# Patient Record
Sex: MALE | Race: WHITE | NOT HISPANIC OR LATINO
[De-identification: names, ages, dates, MRNs, and addresses within clinical notes are randomized per-mention and may not be internally consistent; named-entity substitution may affect disease eponyms.]

---

## 2020-04-27 ENCOUNTER — RECORDS - HEALTHEAST (OUTPATIENT)
Dept: ADMINISTRATIVE | Facility: OTHER | Age: 60
End: 2020-04-27

## 2020-04-28 ENCOUNTER — HOSPITAL ENCOUNTER (OUTPATIENT)
Dept: MRI IMAGING | Facility: HOSPITAL | Age: 60
Discharge: HOME | End: 2020-04-28
Attending: FAMILY MEDICINE

## 2020-04-28 DIAGNOSIS — G89.29 CHRONIC LOWER BACK PAIN: ICD-10-CM

## 2020-04-28 DIAGNOSIS — M54.50 CHRONIC LOWER BACK PAIN: ICD-10-CM

## 2020-12-23 ENCOUNTER — RECORDS - HEALTHEAST (OUTPATIENT)
Dept: LAB | Facility: HOSPITAL | Age: 60
End: 2020-12-23

## 2020-12-23 LAB
ERYTHROCYTE [DISTWIDTH] IN BLOOD BY AUTOMATED COUNT: 11.7 % (ref 11–14.5)
HCT VFR BLD AUTO: 42.3 % (ref 40–54)
HGB BLD-MCNC: 14.4 G/DL (ref 14–18)
MCH RBC QN AUTO: 30.2 PG (ref 27–34)
MCHC RBC AUTO-ENTMCNC: 34 G/DL (ref 32–36)
MCV RBC AUTO: 89 FL (ref 80–100)
PLATELET # BLD AUTO: 225 THOU/UL (ref 140–440)
PMV BLD AUTO: 10.5 FL (ref 8.5–12.5)
RBC # BLD AUTO: 4.77 MILL/UL (ref 4.4–6.2)
WBC: 7.3 THOU/UL (ref 4–11)

## 2021-05-28 ENCOUNTER — RECORDS - HEALTHEAST (OUTPATIENT)
Dept: ADMINISTRATIVE | Facility: CLINIC | Age: 61
End: 2021-05-28

## 2021-05-29 ENCOUNTER — RECORDS - HEALTHEAST (OUTPATIENT)
Dept: ADMINISTRATIVE | Facility: CLINIC | Age: 61
End: 2021-05-29

## 2024-07-01 ENCOUNTER — TRANSFERRED RECORDS (OUTPATIENT)
Dept: HEALTH INFORMATION MANAGEMENT | Facility: CLINIC | Age: 64
End: 2024-07-01

## 2024-07-16 ENCOUNTER — MEDICAL CORRESPONDENCE (OUTPATIENT)
Dept: HEALTH INFORMATION MANAGEMENT | Facility: CLINIC | Age: 64
End: 2024-07-16
Payer: COMMERCIAL

## 2024-07-24 ENCOUNTER — HOSPITAL ENCOUNTER (OUTPATIENT)
Dept: CARDIOLOGY | Facility: HOSPITAL | Age: 64
Discharge: HOME | End: 2024-07-24
Attending: FAMILY MEDICINE | Admitting: FAMILY MEDICINE
Payer: COMMERCIAL

## 2024-07-24 DIAGNOSIS — R51.9 HEAD PAIN: ICD-10-CM

## 2024-07-24 DIAGNOSIS — R42 DIZZINESS: ICD-10-CM

## 2024-07-24 LAB
CV STRESS CURRENT BP HE: NORMAL
CV STRESS CURRENT HR HE: 107
CV STRESS CURRENT HR HE: 107
CV STRESS CURRENT HR HE: 111
CV STRESS CURRENT HR HE: 113
CV STRESS CURRENT HR HE: 119
CV STRESS CURRENT HR HE: 122
CV STRESS CURRENT HR HE: 124
CV STRESS CURRENT HR HE: 75
CV STRESS CURRENT HR HE: 77
CV STRESS CURRENT HR HE: 78
CV STRESS CURRENT HR HE: 79
CV STRESS CURRENT HR HE: 80
CV STRESS CURRENT HR HE: 81
CV STRESS CURRENT HR HE: 81
CV STRESS CURRENT HR HE: 82
CV STRESS CURRENT HR HE: 96
CV STRESS CURRENT HR HE: 96
CV STRESS CURRENT HR HE: 97
CV STRESS CURRENT HR HE: 99
CV STRESS DEVIATION TIME HE: NORMAL
CV STRESS ECHO PERCENT HR HE: NORMAL
CV STRESS EXERCISE STAGE HE: NORMAL
CV STRESS EXERCISE STAGE REACHED HE: NORMAL
CV STRESS FINAL RESTING BP HE: NORMAL
CV STRESS FINAL RESTING HR HE: 80
CV STRESS MAX HR HE: 124
CV STRESS MAX TREADMILL GRADE HE: 12
CV STRESS MAX TREADMILL SPEED HE: 2.5
CV STRESS PEAK DIA BP HE: NORMAL
CV STRESS PEAK SYS BP HE: NORMAL
CV STRESS PHASE HE: NORMAL
CV STRESS PROTOCOL HE: NORMAL
CV STRESS REASON STOPPED HE: NORMAL
CV STRESS RESTING PT POSITION HE: NORMAL
CV STRESS RESTING PT POSITION HE: NORMAL
CV STRESS ST DEVIATION AMOUNT HE: NORMAL
CV STRESS ST DEVIATION ELEVATION HE: NORMAL
CV STRESS ST EVELATION AMOUNT HE: NORMAL
CV STRESS SYMPTOMS HE: NORMAL
CV STRESS TEST TYPE HE: NORMAL
CV STRESS TOTAL STAGE TIME MIN 1 HE: NORMAL
STRESS ECHO BASELINE DIASTOLIC HE: 96
STRESS ECHO BASELINE HR: 76
STRESS ECHO BASELINE SYSTOLIC BP: 153
STRESS ECHO LAST STRESS DIASTOLIC BP: 92
STRESS ECHO LAST STRESS HR: 124
STRESS ECHO LAST STRESS SYSTOLIC BP: 168
STRESS ECHO POST ESTIMATED WORKLOAD: 7.1
STRESS ECHO POST EXERCISE DUR MIN: 5
STRESS ECHO POST EXERCISE DUR SEC: 15
STRESS ECHO TARGET HR: 133

## 2024-07-24 PROCEDURE — 93325 DOPPLER ECHO COLOR FLOW MAPG: CPT | Mod: TC

## 2024-07-24 PROCEDURE — 93018 CV STRESS TEST I&R ONLY: CPT | Performed by: GENERAL ACUTE CARE HOSPITAL

## 2024-07-24 PROCEDURE — 93350 STRESS TTE ONLY: CPT | Mod: 26 | Performed by: GENERAL ACUTE CARE HOSPITAL

## 2024-07-24 PROCEDURE — 93325 DOPPLER ECHO COLOR FLOW MAPG: CPT | Mod: 26 | Performed by: GENERAL ACUTE CARE HOSPITAL

## 2024-07-24 PROCEDURE — 93016 CV STRESS TEST SUPVJ ONLY: CPT | Performed by: INTERNAL MEDICINE

## 2024-07-24 PROCEDURE — 93321 DOPPLER ECHO F-UP/LMTD STD: CPT | Mod: 26 | Performed by: GENERAL ACUTE CARE HOSPITAL

## 2024-08-06 ENCOUNTER — TRANSCRIBE ORDERS (OUTPATIENT)
Dept: OTHER | Age: 64
End: 2024-08-06

## 2024-08-06 DIAGNOSIS — R51.9 HEAD PAIN: ICD-10-CM

## 2024-08-06 DIAGNOSIS — R51.9 HEAD PAIN: Primary | ICD-10-CM

## 2024-08-06 DIAGNOSIS — R42 DIZZINESS: Primary | ICD-10-CM

## 2024-09-04 ENCOUNTER — TRANSCRIBE ORDERS (OUTPATIENT)
Dept: OTHER | Age: 64
End: 2024-09-04

## 2024-09-04 DIAGNOSIS — R42 DIZZINESS: ICD-10-CM

## 2024-09-04 DIAGNOSIS — R55 PRE-SYNCOPE: Primary | ICD-10-CM

## 2024-09-04 NOTE — PROGRESS NOTES
HEART CARE OUT-PATIENT CONSULTATON NOTE      [facility]  [phone]      Assessment/Recommendations   Assessment: 64 year old male with dizziness    Plan:  Dizziness, chest pain, dyspnea, fatigue   Unclear etiology, though based on BP suspect due to orthostasis.  With the exertional nature do worry about ischemia though dizziness would be very atypical presentation.  Also consider arrhythmia vs noncardiac       -CTA       -14 day Zio      -Follow-up with PCP, consider neurology evaluation     Elevated BP  High at stress test, not on meds, very low today.  Concern for autonomic dysfunction, wife states BP always low       -If cardiac testing negative consider referral to neurology     Enlarged aorta   Reviewed diagnosis, natural history, surveillance       -Annual echocardiogram due 8/2025      -No lifting more than he can easily without straining      -Check abdominal US to ensue no AAA          History of Present Illness/Subjective    Indication for Consult:  I was asked to see Jimbo Sigala by Adam Almonte  for dizziness      HPI: Jimbo Sigala is a 64 year old male with a history of RA, no known CAD who presents for evaluation of dizziness.  He had stress echocardiogram that was negative at submax workload, normal rest images other than aorta 42mm.  He was advised to see cardiology.    He reports dizziness like a profound weakness, no vertigo, blurred vision, weak when in [place] left arm weak, no strength when exerting himself.  Also when walking up and down steps or mowing still happening.  Once in a while gas pain in chest, not exertional.  Also feels very fatigued, tired, tales nap in afternoon.  Seems like significant change in his health and now preventing him from doing things. No palpitations.       I reviewed notes from PCP prior to this visit.         Physical Examination  Past Cardiac History   Vitals: BP 92/52 (BP Location: Right arm, Patient Position: Sitting,  "Cuff Size: Adult Large)   Pulse 91   Resp 18   Ht 1.778 m (5' 10\")   Wt 90.7 kg (200 lb)   SpO2 95%   BMI 28.70 kg/m    BMI= Body mass index is 28.7 kg/m .  Wt Readings from Last 3 Encounters:   09/06/24 90.7 kg (200 lb)       General Appearance:   no distress, normal body habitus   ENT/Mouth: membranes moist, no oral lesions or bleeding gums.      EYES:  no scleral icterus, normal conjunctivae   Neck: no carotid bruits or thyromegaly   Chest/Lungs:   lungs are clear to auscultation, no rales or wheezing,  sternal scar, equal chest wall expansion    Cardiovascular:   Regular. Normal first and second heart sounds with no murmurs, rubs, or gallops; the carotid, radial and posterior tibial pulses are intact, Jugular venous pressure normal, No edema bilaterally    Abdomen:  no organomegaly, masses, bruits, or tenderness; bowel sounds are present   Extremities: no cyanosis or clubbing   Skin: no xanthelasma, warm.    Neurologic: normal  bilateral, no tremors           Aorta 42mm     Most Recent Echocardiogram: 7/24/2024  1. Left ventricular chamber size, wall thickness, and systolic function are  normal. The visually estimated left ventricular ejection fraction is 55-60%.  2. Right ventricular chamber size and systolic function are normal.  3. No hemodynamically significant valvular abnormalities.  4. Non-aneurysmal enlargement of the aortic root measurinig 4.2 cm.       Most Recent Stress Test: 7/24/2024  1. Non-diagnostic exercise stress echocardiogram due to the inability to  achieve the target heart rate. No evidence of stress-induced ischemia at the  achieved workload. Visually estimated post exercise left ventricular ejection  fraction increases to greater than 70%.  2. No electrocardiographic evidence of stress-induced ischemia at the achieved  workload.  3. Functional capacity is mildly impaired. The patient exercised for 5:15  minutes on the Jamil protocol, achieving 7.1 METs and 79% the " "age-predicted  maximum heart rate. The patient experienced dizziness during exercise but did  not report chest pain.    Most Recent Angiogram: None       Medical History  Family History Social History   RA  Dad had MI age 40 and at 75, atrial fibrillation, CHF     Social History     Socioeconomic History    Marital status:      Spouse name: Not on file    Number of children: Not on file    Years of education: Not on file    Highest education level: Not on file   Occupational History    Not on file   Tobacco Use    Smoking status: Never     Passive exposure: Past    Smokeless tobacco: Never   Substance and Sexual Activity    Alcohol use: Not on file    Drug use: Never    Sexual activity: Not on file   Other Topics Concern    Not on file   Social History Narrative    Not on file     Social Determinants of Health     Financial Resource Strain: Not on file   Food Insecurity: Not on file   Transportation Needs: Not on file   Physical Activity: Not on file   Stress: Not on file   Social Connections: Not on file   Interpersonal Safety: Not on file   Housing Stability: Not on file           Medications  Allergies   Current Outpatient Medications   Medication Sig Dispense Refill    ALPRAZolam (XANAX) 0.25 MG tablet Take 0.25 mg by mouth as needed (A HALF HOUR BEFORE FLYING).      diazepam (VALIUM) 5 MG tablet Take 5 mg by mouth as needed (30 MINUTES PRIOR TO MRI. MAY REPEAT IF NEEDED).      Multiple Vitamin (ONE-A-DAY ESSENTIAL) TABS Take 1 tablet by mouth daily.      rosuvastatin (CRESTOR) 5 MG tablet Take 1 tablet by mouth daily.       No Known Allergies       Lab Results    Chemistry/lipid CBC Cardiac Enzymes/BNP/TSH/INR   No results for input(s): \"CHOL\", \"HDL\", \"LDL\", \"TRIG\", \"CHOLHDLRATIO\" in the last 76449 hours.  No results for input(s): \"LDL\" in the last 07005 hours.  No results for input(s): \"NA\", \"POTASSIUM\", \"CHLORIDE\", \"CO2\", \"GLC\", \"BUN\", \"CR\", \"GFRESTIMATED\", \"FRANCA\" in the last 03465 hours.    Invalid " "input(s): \"GRFESTBLACK\"  No results for input(s): \"CR\" in the last 33088 hours.  No results for input(s): \"A1C\" in the last 65630 hours.       Recent Labs   Lab Test 12/23/20  1232   WBC 7.3   HGB 14.4   HCT 42.3   MCV 89        Recent Labs   Lab Test 12/23/20  1232   HGB 14.4    No results for input(s): \"TROPONINI\" in the last 44105 hours.  No results for input(s): \"BNP\", \"NTBNPI\", \"NTBNP\" in the last 56393 hours.  No results for input(s): \"TSH\" in the last 95878 hours.  No results for input(s): \"INR\" in the last 02663 hours.     Ashleigh Rothman MD  Noninvasive Cardiologist   [facility]                                    "

## 2024-09-06 ENCOUNTER — OFFICE VISIT (OUTPATIENT)
Dept: CARDIOLOGY | Facility: CLINIC | Age: 64
End: 2024-09-06
Payer: COMMERCIAL

## 2024-09-06 VITALS
DIASTOLIC BLOOD PRESSURE: 52 MMHG | BODY MASS INDEX: 28.63 KG/M2 | WEIGHT: 200 LBS | SYSTOLIC BLOOD PRESSURE: 92 MMHG | RESPIRATION RATE: 18 BRPM | OXYGEN SATURATION: 95 % | HEIGHT: 70 IN | HEART RATE: 91 BPM

## 2024-09-06 DIAGNOSIS — R06.09 DOE (DYSPNEA ON EXERTION): ICD-10-CM

## 2024-09-06 DIAGNOSIS — I71.21 ANEURYSM OF ASCENDING AORTA WITHOUT RUPTURE (H): ICD-10-CM

## 2024-09-06 DIAGNOSIS — R42 DIZZINESS: ICD-10-CM

## 2024-09-06 DIAGNOSIS — R51.9 NONINTRACTABLE HEADACHE, UNSPECIFIED CHRONICITY PATTERN, UNSPECIFIED HEADACHE TYPE: ICD-10-CM

## 2024-09-06 DIAGNOSIS — R94.39 ABNORMAL STRESS TEST: Primary | ICD-10-CM

## 2024-09-06 PROCEDURE — 99204 OFFICE O/P NEW MOD 45 MIN: CPT | Performed by: INTERNAL MEDICINE

## 2024-09-06 RX ORDER — ROSUVASTATIN CALCIUM 5 MG/1
1 TABLET, COATED ORAL DAILY
COMMUNITY
Start: 2024-07-17

## 2024-09-06 RX ORDER — DIAZEPAM 5 MG
5 TABLET ORAL PRN
COMMUNITY
Start: 2024-06-21

## 2024-09-06 RX ORDER — ALPRAZOLAM 0.25 MG
0.25 TABLET ORAL PRN
COMMUNITY
Start: 2024-01-19

## 2024-09-06 RX ORDER — MULTIVITAMIN
1 TABLET ORAL DAILY
COMMUNITY

## 2024-09-06 NOTE — LETTER
9/6/2024    Adam Ty MD  [facility] [address]  [place] [address]    RE: Jimbo Sigala       Dear Colleague,     [facility] the [facility].    HEART CARE OUT-PATIENT CONSULTATON NOTE      [facility]  [phone]      Assessment/Recommendations   Assessment: 64 year old male with dizziness    Plan:  Dizziness, chest pain, dyspnea, fatigue   Unclear etiology, though based on BP suspect due to orthostasis.  With the exertional nature do worry about ischemia though dizziness would be very atypical presentation.  Also consider arrhythmia vs noncardiac       -CTA       -14 day Zio      -Follow-up with PCP, consider neurology evaluation     Elevated BP  High at stress test, not on meds, very low today.  Concern for autonomic dysfunction, wife states BP always low       -If cardiac testing negative consider referral to neurology     Enlarged aorta   Reviewed diagnosis, natural history, surveillance       -Annual echocardiogram due 8/2025      -No lifting more than he can easily without straining      -Check abdominal US to ensue no AAA          History of Present Illness/Subjective    Indication for Consult:  I was asked to see Jimbo Sigala by Adam Almonte  for dizziness      HPI: Jimbo Sigala is a 64 year old male with a history of RA, no known CAD who presents for evaluation of dizziness.  He had stress echocardiogram that was negative at submax workload, normal rest images other than aorta 42mm.  He was advised to see cardiology.    He reports dizziness like a profound weakness, no vertigo, blurred vision, weak when in [place] left arm weak, no strength when exerting himself.  Also when walking up and down steps or mowing still happening.  Once in a while gas pain in chest, not exertional.  Also feels very fatigued, tired, tales nap in afternoon.  Seems like significant change in his  "health and now preventing him from doing things. No palpitations.       I reviewed notes from PCP prior to this visit.         Physical Examination  Past Cardiac History   Vitals: BP 92/52 (BP Location: Right arm, Patient Position: Sitting, Cuff Size: Adult Large)   Pulse 91   Resp 18   Ht 1.778 m (5' 10\")   Wt 90.7 kg (200 lb)   SpO2 95%   BMI 28.70 kg/m    BMI= Body mass index is 28.7 kg/m .  Wt Readings from Last 3 Encounters:   09/06/24 90.7 kg (200 lb)       General Appearance:   no distress, normal body habitus   ENT/Mouth: membranes moist, no oral lesions or bleeding gums.      EYES:  no scleral icterus, normal conjunctivae   Neck: no carotid bruits or thyromegaly   Chest/Lungs:   lungs are clear to auscultation, no rales or wheezing,  sternal scar, equal chest wall expansion    Cardiovascular:   Regular. Normal first and second heart sounds with no murmurs, rubs, or gallops; the carotid, radial and posterior tibial pulses are intact, Jugular venous pressure normal, No edema bilaterally    Abdomen:  no organomegaly, masses, bruits, or tenderness; bowel sounds are present   Extremities: no cyanosis or clubbing   Skin: no xanthelasma, warm.    Neurologic: normal  bilateral, no tremors           Aorta 42mm     Most Recent Echocardiogram: 7/24/2024  1. Left ventricular chamber size, wall thickness, and systolic function are  normal. The visually estimated left ventricular ejection fraction is 55-60%.  2. Right ventricular chamber size and systolic function are normal.  3. No hemodynamically significant valvular abnormalities.  4. Non-aneurysmal enlargement of the aortic root measurinig 4.2 cm.       Most Recent Stress Test: 7/24/2024  1. Non-diagnostic exercise stress echocardiogram due to the inability to  achieve the target heart rate. No evidence of stress-induced ischemia at the  achieved workload. Visually estimated post exercise left ventricular ejection  fraction increases to greater than " "70%.  2. No electrocardiographic evidence of stress-induced ischemia at the achieved  workload.  3. Functional capacity is mildly impaired. The patient exercised for 5:15  minutes on the Jamil protocol, achieving 7.1 METs and 79% the age-predicted  maximum heart rate. The patient experienced dizziness during exercise but did  not report chest pain.    Most Recent Angiogram: None       Medical History  Family History Social History   RA  Dad had MI age 40 and at 75, atrial fibrillation, CHF     Social History     Socioeconomic History     Marital status:      Spouse name: Not on file     Number of children: Not on file     Years of education: Not on file     Highest education level: Not on file   Occupational History     Not on file   Tobacco Use     Smoking status: Never     Passive exposure: Past     Smokeless tobacco: Never   Substance and Sexual Activity     Alcohol use: Not on file     Drug use: Never     Sexual activity: Not on file   Other Topics Concern     Not on file   Social History Narrative     Not on file     Social Determinants of Health     Financial Resource Strain: Not on file   Food Insecurity: Not on file   Transportation Needs: Not on file   Physical Activity: Not on file   Stress: Not on file   Social Connections: Not on file   Interpersonal Safety: Not on file   Housing Stability: Not on file           Medications  Allergies   Current Outpatient Medications   Medication Sig Dispense Refill     ALPRAZolam (XANAX) 0.25 MG tablet Take 0.25 mg by mouth as needed (A HALF HOUR BEFORE FLYING).       diazepam (VALIUM) 5 MG tablet Take 5 mg by mouth as needed (30 MINUTES PRIOR TO MRI. MAY REPEAT IF NEEDED).       Multiple Vitamin (ONE-A-DAY ESSENTIAL) TABS Take 1 tablet by mouth daily.       rosuvastatin (CRESTOR) 5 MG tablet Take 1 tablet by mouth daily.       No Known Allergies       Lab Results    Chemistry/lipid CBC Cardiac Enzymes/BNP/TSH/INR   No results for input(s): \"CHOL\", \"HDL\", \"LDL\", " "\"TRIG\", \"CHOLHDLRATIO\" in the last 33626 hours.  No results for input(s): \"LDL\" in the last 36993 hours.  No results for input(s): \"NA\", \"POTASSIUM\", \"CHLORIDE\", \"CO2\", \"GLC\", \"BUN\", \"CR\", \"GFRESTIMATED\", \"FRANCA\" in the last 58672 hours.    Invalid input(s): \"GRFESTBLACK\"  No results for input(s): \"CR\" in the last 86262 hours.  No results for input(s): \"A1C\" in the last 81984 hours.       Recent Labs   Lab Test 12/23/20  1232   WBC 7.3   HGB 14.4   HCT 42.3   MCV 89        Recent Labs   Lab Test 12/23/20  1232   HGB 14.4    No results for input(s): \"TROPONINI\" in the last 18557 hours.  No results for input(s): \"BNP\", \"NTBNPI\", \"NTBNP\" in the last 91780 hours.  No results for input(s): \"TSH\" in the last 14959 hours.  No results for input(s): \"INR\" in the last 62856 hours.     Ashleigh Rothman MD  Noninvasive Cardiologist   [facility]                                        Thank you for allowing me to participate in the care of your patient.      Sincerely,     Ashleigh Rothman MD     [facility]  cc:   Adam Ty MD  [facility]  [address] [place] [id number]      "

## 2024-09-06 NOTE — PATIENT INSTRUCTIONS
Your symptoms would be quite unusual for a heart issue.  However, since the stress test was not diagnostic I do think a CT scan of your heart to make sure there are no blocked vessels is reasonable.  Additionally, having you wear a heart monitor to make sure there is no arrhythmia causing your symptoms.  If the heart tests look OK then would talk to your primary doctor and consider seeing a neurologist.    Your echocardiogram did show the aorta is a bit enlarged at 42mm.  Normal is 39mm or less.  The aorta size may be normal for you or may the start of an aneurysm.  Will recheck an echocardiogram in 1 year and if no change can move to every other year monitoring.  Will also schedule an ultrasound of the aorta in the abdomen to make sure no enlargement there.

## 2024-09-13 ENCOUNTER — ORDERS ONLY (AUTO-RELEASED) (OUTPATIENT)
Dept: CARDIOLOGY | Facility: CLINIC | Age: 64
End: 2024-09-13
Payer: COMMERCIAL

## 2024-09-13 DIAGNOSIS — R42 DIZZINESS: ICD-10-CM

## 2024-09-13 DIAGNOSIS — R06.09 DOE (DYSPNEA ON EXERTION): ICD-10-CM

## 2024-09-16 ENCOUNTER — TELEPHONE (OUTPATIENT)
Dept: CARDIOLOGY | Facility: CLINIC | Age: 64
End: 2024-09-16
Payer: COMMERCIAL

## 2024-09-16 NOTE — TELEPHONE ENCOUNTER
Left detailed msg for patient's wife Noemy that PA team will address insurance request for PA and notify Dr. Rothman of any issues related to need for additional documentation or denial of services.  mg

## 2024-09-16 NOTE — TELEPHONE ENCOUNTER
[facility] Call Center    Phone Message    May a detailed message be left on voicemail: yes     Reason for Call: Other: patients spouse is calling as insurance needs a PA sent to them for patients upcoming tests, please advise, thank you.     Action Taken: Other: cardiology     Travel Screening: Not Applicable     Date of Service:

## 2024-09-16 NOTE — TELEPHONE ENCOUNTER
[facility] Call Center    Phone Message    May a detailed message be left on voicemail: yes     Reason for Call: Other: Pt wife would like a call back to discuss needing code for procedures for insurance     Action Taken: Other: Cardio    Travel Screening: Not Applicable     Date of Service:

## 2024-09-16 NOTE — TELEPHONE ENCOUNTER
"Return call to patient's wife - provided her with \"cost of care estimates\" and customer service #'s to call to obtain CPT codes for testing recommended by Dr. Rothman - understanding verbalized.  mg  "

## 2024-09-19 ENCOUNTER — TRANSFERRED RECORDS (OUTPATIENT)
Dept: HEALTH INFORMATION MANAGEMENT | Facility: CLINIC | Age: 64
End: 2024-09-19

## 2024-09-23 RX ORDER — NITROGLYCERIN 0.4 MG/1
0.4 TABLET SUBLINGUAL
Status: DISCONTINUED | OUTPATIENT
Start: 2024-09-23 | End: 2024-10-17 | Stop reason: HOSPADM

## 2024-09-23 RX ORDER — DILTIAZEM HYDROCHLORIDE 30 MG/1
120 TABLET, FILM COATED ORAL
Status: DISCONTINUED | OUTPATIENT
Start: 2024-10-16 | End: 2024-10-17 | Stop reason: HOSPADM

## 2024-09-23 RX ORDER — METOPROLOL TARTRATE 25 MG/1
25-100 TABLET, FILM COATED ORAL
Status: DISCONTINUED | OUTPATIENT
Start: 2024-10-16 | End: 2024-10-17 | Stop reason: HOSPADM

## 2024-09-23 RX ORDER — DILTIAZEM HYDROCHLORIDE 5 MG/ML
10-15 INJECTION INTRAVENOUS
Status: DISCONTINUED | OUTPATIENT
Start: 2024-09-23 | End: 2024-10-17 | Stop reason: HOSPADM

## 2024-09-23 RX ORDER — METOPROLOL TARTRATE 1 MG/ML
5-20 INJECTION, SOLUTION INTRAVENOUS
Status: DISCONTINUED | OUTPATIENT
Start: 2024-09-23 | End: 2024-10-17 | Stop reason: HOSPADM

## 2024-09-28 ENCOUNTER — HEALTH MAINTENANCE LETTER (OUTPATIENT)
Age: 64
End: 2024-09-28

## 2024-10-16 ENCOUNTER — HOSPITAL ENCOUNTER (OUTPATIENT)
Dept: ULTRASOUND IMAGING | Facility: CLINIC | Age: 64
Discharge: HOME | End: 2024-10-16
Attending: INTERNAL MEDICINE
Payer: COMMERCIAL

## 2024-10-16 ENCOUNTER — HOSPITAL ENCOUNTER (OUTPATIENT)
Dept: CT IMAGING | Facility: CLINIC | Age: 64
Discharge: HOME | End: 2024-10-16
Attending: INTERNAL MEDICINE
Payer: COMMERCIAL

## 2024-10-16 VITALS — DIASTOLIC BLOOD PRESSURE: 83 MMHG | SYSTOLIC BLOOD PRESSURE: 142 MMHG

## 2024-10-16 DIAGNOSIS — I71.21 ANEURYSM OF ASCENDING AORTA WITHOUT RUPTURE (H): ICD-10-CM

## 2024-10-16 DIAGNOSIS — R94.39 ABNORMAL STRESS TEST: ICD-10-CM

## 2024-10-16 DIAGNOSIS — R06.09 DOE (DYSPNEA ON EXERTION): ICD-10-CM

## 2024-10-16 LAB
BSA FOR ECHO PROCEDURE: 2.09 M2
CCTA ASCENDING AORTA: 3.5
CCTA SINUS: 4.4
CV CALCIUM SCORE AGATSTON LM: 53
CV CALCIUM SCORING AGATSON LAD: 127
CV CALCIUM SCORING AGATSTON CX: 5
CV CALCIUM SCORING AGATSTON RCA: 0
CV CALCIUM SCORING AGATSTON TOTAL: 185

## 2024-10-16 PROCEDURE — 250N000013 HC RX MED GY IP 250 OP 250 PS 637: Performed by: INTERNAL MEDICINE

## 2024-10-16 PROCEDURE — 75574 CT ANGIO HRT W/3D IMAGE: CPT | Mod: 26 | Performed by: GENERAL ACUTE CARE HOSPITAL

## 2024-10-16 PROCEDURE — 76706 US ABDL AORTA SCREEN AAA: CPT

## 2024-10-16 PROCEDURE — 75574 CT ANGIO HRT W/3D IMAGE: CPT

## 2024-10-16 RX ORDER — IOPAMIDOL 755 MG/ML
100 INJECTION, SOLUTION INTRAVASCULAR ONCE
Status: DISCONTINUED | OUTPATIENT
Start: 2024-10-16 | End: 2024-10-17 | Stop reason: HOSPADM

## 2024-10-16 RX ADMIN — NITROGLYCERIN 0.4 MG: 0.4 TABLET SUBLINGUAL at 06:55

## 2024-10-18 ENCOUNTER — LAB (OUTPATIENT)
Dept: LAB | Facility: CLINIC | Age: 64
End: 2024-10-18
Payer: COMMERCIAL

## 2024-10-18 DIAGNOSIS — H50.22 HYPERTROPIA OF LEFT EYE: Primary | ICD-10-CM

## 2024-10-18 LAB
CRP SERPL-MCNC: <3 MG/L
ERYTHROCYTE [SEDIMENTATION RATE] IN BLOOD BY WESTERGREN METHOD: 7 MM/HR (ref 0–20)
PLATELET # BLD AUTO: 196 10E3/UL (ref 150–450)

## 2024-10-18 PROCEDURE — 85049 AUTOMATED PLATELET COUNT: CPT

## 2024-10-18 PROCEDURE — 36415 COLL VENOUS BLD VENIPUNCTURE: CPT

## 2024-10-18 PROCEDURE — 85652 RBC SED RATE AUTOMATED: CPT

## 2024-10-18 PROCEDURE — 86140 C-REACTIVE PROTEIN: CPT

## 2024-10-21 DIAGNOSIS — I71.21 ANEURYSM OF ASCENDING AORTA WITHOUT RUPTURE (H): Primary | ICD-10-CM

## 2024-10-21 DIAGNOSIS — R06.09 DOE (DYSPNEA ON EXERTION): ICD-10-CM

## 2024-10-21 DIAGNOSIS — R94.39 ABNORMAL STRESS TEST: ICD-10-CM

## 2024-10-24 DIAGNOSIS — I71.21 ANEURYSM OF ASCENDING AORTA WITHOUT RUPTURE (H): Primary | ICD-10-CM

## 2024-10-31 DIAGNOSIS — I25.10 CAD (CORONARY ARTERY DISEASE): Primary | ICD-10-CM

## 2024-11-19 ENCOUNTER — LAB (OUTPATIENT)
Dept: LAB | Facility: HOSPITAL | Age: 64
End: 2024-11-19
Payer: COMMERCIAL

## 2024-11-19 DIAGNOSIS — I25.10 CAD (CORONARY ARTERY DISEASE): ICD-10-CM

## 2024-11-19 LAB
CHOLEST SERPL-MCNC: 195 MG/DL
FASTING STATUS PATIENT QL REPORTED: YES
HDLC SERPL-MCNC: 55 MG/DL
LDLC SERPL CALC-MCNC: 123 MG/DL
NONHDLC SERPL-MCNC: 140 MG/DL
TRIGL SERPL-MCNC: 83 MG/DL

## 2024-11-19 PROCEDURE — 36415 COLL VENOUS BLD VENIPUNCTURE: CPT

## 2024-11-19 PROCEDURE — 80061 LIPID PANEL: CPT

## 2024-12-02 ENCOUNTER — OFFICE VISIT (OUTPATIENT)
Dept: NEUROLOGY | Facility: CLINIC | Age: 64
End: 2024-12-02
Attending: FAMILY MEDICINE
Payer: COMMERCIAL

## 2024-12-02 VITALS
HEIGHT: 70 IN | HEART RATE: 84 BPM | BODY MASS INDEX: 28.63 KG/M2 | SYSTOLIC BLOOD PRESSURE: 118 MMHG | WEIGHT: 200 LBS | DIASTOLIC BLOOD PRESSURE: 79 MMHG

## 2024-12-02 DIAGNOSIS — R29.898 ARM WEAKNESS: ICD-10-CM

## 2024-12-02 DIAGNOSIS — R55 PRE-SYNCOPE: ICD-10-CM

## 2024-12-02 DIAGNOSIS — R42 DIZZINESS: ICD-10-CM

## 2024-12-02 DIAGNOSIS — G43.809 OTHER MIGRAINE WITHOUT STATUS MIGRAINOSUS, NOT INTRACTABLE: ICD-10-CM

## 2024-12-02 DIAGNOSIS — M48.02 NEUROFORAMINAL STENOSIS OF CERVICAL SPINE: Primary | ICD-10-CM

## 2024-12-02 PROCEDURE — G2211 COMPLEX E/M VISIT ADD ON: HCPCS | Performed by: PSYCHIATRY & NEUROLOGY

## 2024-12-02 PROCEDURE — 99205 OFFICE O/P NEW HI 60 MIN: CPT | Performed by: PSYCHIATRY & NEUROLOGY

## 2024-12-02 RX ORDER — NORTRIPTYLINE HYDROCHLORIDE 10 MG/1
10 CAPSULE ORAL AT BEDTIME
COMMUNITY
Start: 2024-11-05 | End: 2024-12-02

## 2024-12-02 RX ORDER — GABAPENTIN 100 MG/1
200 CAPSULE ORAL 2 TIMES DAILY
COMMUNITY
Start: 2024-10-30

## 2024-12-02 RX ORDER — NORTRIPTYLINE HYDROCHLORIDE 10 MG/1
CAPSULE ORAL
Qty: 180 CAPSULE | Refills: 1 | Status: SHIPPED | OUTPATIENT
Start: 2024-12-02

## 2024-12-02 NOTE — LETTER
12/2/2024      Jimbo Sigala  [address]   [place] [address]      Dear Colleague,    Thank you for referring your patient, Jimbo Sigala, to the [facility] NEUROLOG[facility] [place]. Please see a copy of my visit note below.    NEUROLOGY OUTPATIENT CONSULT NOTE   Dec 2, 2024     CHIEF COMPLAINT/REASON FOR VISIT/REASON FOR CONSULT  Patient presents with:  Consult: Weakness in left arm, shooting pain up into his neck, occasional dizziness with exertion, pre-syncope, headaches.    REASON FOR CONSULTATION-dizziness/left arm weakness/neck pain/headaches    REFERRAL SOURCE  Dr. Adam Ty  CC Dr. Adam Ty    HISTORY OF PRESENT ILLNESS  Jimbo Sigala is a 64 year old male seen today for evaluation of multiple issues.  Symptoms have been coming on over the last 3 to 4 years.  He did have COVID in 2020 at the beginning of the pandemic and feels that he continues to have ongoing symptoms from the COVID    1.  He reports ongoing neck pain with some shooting pain radiating down his arm but more pronounced from the forearm all the way down.  Does complain of numbness on both sides of the hand.  There is some weakness going on in the arm as well.  Has been dropping objects with his hand.  Has not done physical therapy.  Did have an MRI C-spine which did show multiple level neuroforaminal stenosis though epidural injections have not been done.    2.  He further complains of headaches that started at the base of the neck and then radiate all the way to the left temple.  These are more dull in nature.  Previously they were more migrainous in nature.  No major photophobia and photophobia.  No visual auras.  Has been put on gabapentin and nortriptyline which have been helping.  Nortriptyline is helping more than the Gabapentin.    3.  He further reports that with activity he will get more of a lightheadedness.  He has been working with cardiology and they have not really found a cause they  "had difficulty completing the stress test.  Neurological testing has been recommended by the cardiologist.  There is no vertiginous sensation.    Previous history is reviewed and this is unchanged.    PAST MEDICAL/SURGICAL HISTORY  No past medical history on file.  There is no problem list on file for this patient.  Significant of high cholesterol, migraines, tremors, arthritis    FAMILY HISTORY  No family history on file.  Negative for neurological problems other than the memory problems    SOCIAL HISTORY  Social History     Tobacco Use     Smoking status: Never     Passive exposure: Past     Smokeless tobacco: Never   Substance Use Topics     Drug use: Never       SYSTEMS REVIEW  Twelve-system ROS was done and other than the HPI this was negative/positive for neck pain, back pain, arm and leg pain, joint pain, numbness/tingling, weakness, dizziness, ringing in the ears, vision symptoms, sleeping during the day, anxiety.    MEDICATIONS  Current Outpatient Medications   Medication Sig Dispense Refill     ALPRAZolam (XANAX) 0.25 MG tablet Take 0.25 mg by mouth as needed (A HALF HOUR BEFORE FLYING).       diazepam (VALIUM) 5 MG tablet Take 5 mg by mouth as needed (30 MINUTES PRIOR TO MRI. MAY REPEAT IF NEEDED).       gabapentin (NEURONTIN) 100 MG capsule Take 200 mg by mouth 2 times daily.       Multiple Vitamin (ONE-A-DAY ESSENTIAL) TABS Take 1 tablet by mouth daily.       nortriptyline (PAMELOR) 10 MG capsule Take 2 cap/night and then increase by 1 cap/week to a max of 3 caps/night as needed and tolerated. 180 capsule 1     rosuvastatin (CRESTOR) 10 MG tablet Take 1 tablet (10 mg) by mouth daily. 90 tablet 1     No current facility-administered medications for this visit.        PHYSICAL EXAMINATION  VITALS: /79 (BP Location: Right arm, Patient Position: Sitting)   Pulse 84   Ht 1.778 m (5' 10\")   Wt 90.7 kg (200 lb)   BMI 28.70 kg/m    GENERAL: Healthy appearing, alert, no acute distress, normal " habitus.  CARDIOVASCULAR: Extremities warm and well perfused. Pulses present.   NEUROLOGICAL:  Patient is awake and oriented to self, place and time.  Attention span is normal.  Memory is grossly intact.  Language is fluent and follows commands appropriately.  Appropriate fund of knowledge. Cranial nerves 2-12 are intact. There is no pronator drift.  Motor exam shows 5/5 strength in all extremities except left shoulder abduction and elbow flexion is 4/5.  Thumb opposition is slightly weak.  Tone is symmetric bilaterally in upper and lower extremities.  Reflexes are symmetric and 2+ in upper extremities and lower extremities. Sensory exam is grossly intact to light touch, pin prick and vibration except decreased pinprick in the left hand.  Finger to nose and heel to shin is without dysmetria.  Romberg is negative.  Gait is normal and the patient is able to do tandem walk and walk on toes and heels.      DIAGNOSTICS  MRI IAC  1.  Normal enhanced MR I of the brain and internal auditory canals  2.  Mucosal thickening in the right maxillary sinus    MRI C-spine  1.  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  High-grade right foraminal stenosis at C4-C5 C5-C6 C6-C7 and C7-T1  3.  High-grade left foraminal stenosis at C6-C7 and C7-T1.  Moderate left form of stenosis at C3-C4 C4-C5 and C5-C6  4.  Prominent Modic type II changes at C5-C6    CARDIOLOGY      ECHO  Stress findings:  1. Non-diagnostic exercise stress echocardiogram due to the inability to  achieve the target heart rate. No evidence of stress-induced ischemia at the  achieved workload. Visually estimated post exercise left ventricular ejection  fraction increases to greater than 70%.  2. No electrocardiographic evidence of stress-induced ischemia at the achieved  workload.  3. Functional capacity is mildly impaired. The patient exercised for 5:15  minutes on the Jamil protocol, achieving 7.1 METs and 79% the age-predicted  maximum heart rate.  The patient experienced dizziness during exercise but did  not report chest pain.     Rest findings:  1. Left ventricular chamber size, wall thickness, and systolic function are  normal. The visually estimated left ventricular ejection fraction is 55-60%.  2. Right ventricular chamber size and systolic function are normal.  3. No hemodynamically significant valvular abnormalities.  4. Non-aneurysmal enlargement of the aortic root measurinig 4.2 cm.    OUTSIDE RECORDS  Outside referral notes and chart notes were reviewed and pertinent information has been summarized (in addition to the HPI):-        IMPRESSION/REPORT/PLAN  Neuroforaminal stenosis of cervical spine rule out cervical radiculopathy  Other migraine without status migrainosus, not intractable  Exertional dizziness/lightheadedness  Left arm weakness/numbness    This is a 64 year old male with multiple symptoms  1.  The left arm weakness/along with shooting pain down the arm is suggestive of cervical radiculopathy.  MRI C-spine does show multilevel left-sided neuroforaminal stenosis.  Will check a EMG to confirm diagnosis and to rule out entrapment neuropathy.  Will set him up with physical therapy in the meantime to see if it helps.    2.  This is headaches radiating from the neck to his left temple are suggestive of migrainous headaches.  Could be related to cervical degeneration.  Will see if physical therapy helps.  Nortriptyline has made a difference and will increase the dose further to see if it provides more benefit.  Continue using over-the-counter medications if needed for abortive therapy.    3.  His exertional dizziness/lightheadedness is more suggestive of cardiac problems.  Cardiac testing so far has been noncontributory.  He has been following up with cardiology.  Will check a CT angiogram head and neck to rule out any vertebrobasilar insufficiency which could be causing the lightheadedness.    I can see him back in 3 to 4 months after  testing.  He does travel in the winter and can do physical therapy during vacation.    -     EMG; Future  -     Physical Therapy  Referral; Future  -     CTA Head Neck w Contrast; Future  -     nortriptyline (PAMELOR) 10 MG capsule; Take 2 cap/night and then increase by 1 cap/week to a max of 3 caps/night as needed and tolerated.    Return in about 4 months (around 4/2/2025) for In-Clinic Visit (must), After testing.    Over 60 minutes were spent coordinating the care for the patient on the day of the encounter.  This includes previsit, during visit and post visit activities as documented above.  Counseling patient.  Multiple problems reviewed/addressed.  Reviewing outside records.  Testing ordered.  Prescription management.  (Activities include but not inclusive of reviewing chart, reviewing outside records, reviewing labs and imaging study results as well as the images, patient visit time including getting history and exam,  use if applicable, review of test results with the patient and coming up with a plan in a shared model, counseling patient and family, education and answering patient questions, EMR , EMR diagnosis entry and problem list management, medication reconciliation and prescription management if applicable, paperwork if applicable, printing documents and documentation of the visit activities.)          Steve Funk MD  Neurologist  [facility] Neurology [facility]  Tel:- [phone]    This note was dictated using voice recognition software.  Any grammatical or context distortions are unintentional and inherent to the software.      Again, thank you for allowing me to participate in the care of your patient.        Sincerely,        Steve Funk MD

## 2024-12-02 NOTE — NURSING NOTE
Chief Complaint   Patient presents with    Consult     Weakness in left arm, shooting pain up into his neck, occasional dizziness with exertion, pre-syncope, headaches.     Blanquita Zuñiga MA

## 2024-12-02 NOTE — PROGRESS NOTES
NEUROLOGY OUTPATIENT CONSULT NOTE   Dec 2, 2024     CHIEF COMPLAINT/REASON FOR VISIT/REASON FOR CONSULT  Patient presents with:  Consult: Weakness in left arm, shooting pain up into his neck, occasional dizziness with exertion, pre-syncope, headaches.    REASON FOR CONSULTATION-dizziness/left arm weakness/neck pain/headaches    REFERRAL SOURCE  Dr. Adam Ty   Dr. Adam Ty    HISTORY OF PRESENT ILLNESS  Jimbo Sigala is a 64 year old male seen today for evaluation of multiple issues.  Symptoms have been coming on over the last 3 to 4 years.  He did have COVID in 2020 at the beginning of the pandemic and feels that he continues to have ongoing symptoms from the COVID    1.  He reports ongoing neck pain with some shooting pain radiating down his arm but more pronounced from the forearm all the way down.  Does complain of numbness on both sides of the hand.  There is some weakness going on in the arm as well.  Has been dropping objects with his hand.  Has not done physical therapy.  Did have an MRI C-spine which did show multiple level neuroforaminal stenosis though epidural injections have not been done.    2.  He further complains of headaches that started at the base of the neck and then radiate all the way to the left temple.  These are more dull in nature.  Previously they were more migrainous in nature.  No major photophobia and photophobia.  No visual auras.  Has been put on gabapentin and nortriptyline which have been helping.  Nortriptyline is helping more than the Gabapentin.    3.  He further reports that with activity he will get more of a lightheadedness.  He has been working with cardiology and they have not really found a cause they had difficulty completing the stress test.  Neurological testing has been recommended by the cardiologist.  There is no vertiginous sensation.    Previous history is reviewed and this is unchanged.    PAST MEDICAL/SURGICAL HISTORY  No past medical history on  "file.  There is no problem list on file for this patient.  Significant of high cholesterol, migraines, tremors, arthritis    FAMILY HISTORY  No family history on file.  Negative for neurological problems other than the memory problems    SOCIAL HISTORY  Social History     Tobacco Use    Smoking status: Never     Passive exposure: Past    Smokeless tobacco: Never   Substance Use Topics    Drug use: Never       SYSTEMS REVIEW  Twelve-system ROS was done and other than the HPI this was negative/positive for neck pain, back pain, arm and leg pain, joint pain, numbness/tingling, weakness, dizziness, ringing in the ears, vision symptoms, sleeping during the day, anxiety.    MEDICATIONS  Current Outpatient Medications   Medication Sig Dispense Refill    ALPRAZolam (XANAX) 0.25 MG tablet Take 0.25 mg by mouth as needed (A HALF HOUR BEFORE FLYING).      diazepam (VALIUM) 5 MG tablet Take 5 mg by mouth as needed (30 MINUTES PRIOR TO MRI. MAY REPEAT IF NEEDED).      gabapentin (NEURONTIN) 100 MG capsule Take 200 mg by mouth 2 times daily.      Multiple Vitamin (ONE-A-DAY ESSENTIAL) TABS Take 1 tablet by mouth daily.      nortriptyline (PAMELOR) 10 MG capsule Take 2 cap/night and then increase by 1 cap/week to a max of 3 caps/night as needed and tolerated. 180 capsule 1    rosuvastatin (CRESTOR) 10 MG tablet Take 1 tablet (10 mg) by mouth daily. 90 tablet 1     No current facility-administered medications for this visit.        PHYSICAL EXAMINATION  VITALS: /79 (BP Location: Right arm, Patient Position: Sitting)   Pulse 84   Ht 1.778 m (5' 10\")   Wt 90.7 kg (200 lb)   BMI 28.70 kg/m    GENERAL: Healthy appearing, alert, no acute distress, normal habitus.  CARDIOVASCULAR: Extremities warm and well perfused. Pulses present.   NEUROLOGICAL:  Patient is awake and oriented to self, place and time.  Attention span is normal.  Memory is grossly intact.  Language is fluent and follows commands appropriately.  Appropriate fund " of knowledge. Cranial nerves 2-12 are intact. There is no pronator drift.  Motor exam shows 5/5 strength in all extremities except left shoulder abduction and elbow flexion is 4/5.  Thumb opposition is slightly weak.  Tone is symmetric bilaterally in upper and lower extremities.  Reflexes are symmetric and 2+ in upper extremities and lower extremities. Sensory exam is grossly intact to light touch, pin prick and vibration except decreased pinprick in the left hand.  Finger to nose and heel to shin is without dysmetria.  Romberg is negative.  Gait is normal and the patient is able to do tandem walk and walk on toes and heels.      DIAGNOSTICS  MRI IAC  1.  Normal enhanced MR I of the brain and internal auditory canals  2.  Mucosal thickening in the right maxillary sinus    MRI C-spine  1.  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  High-grade right foraminal stenosis at C4-C5 C5-C6 C6-C7 and C7-T1  3.  High-grade left foraminal stenosis at C6-C7 and C7-T1.  Moderate left form of stenosis at C3-C4 C4-C5 and C5-C6  4.  Prominent Modic type II changes at C5-C6    CARDIOLOGY      ECHO  Stress findings:  1. Non-diagnostic exercise stress echocardiogram due to the inability to  achieve the target heart rate. No evidence of stress-induced ischemia at the  achieved workload. Visually estimated post exercise left ventricular ejection  fraction increases to greater than 70%.  2. No electrocardiographic evidence of stress-induced ischemia at the achieved  workload.  3. Functional capacity is mildly impaired. The patient exercised for 5:15  minutes on the Jamil protocol, achieving 7.1 METs and 79% the age-predicted  maximum heart rate. The patient experienced dizziness during exercise but did  not report chest pain.     Rest findings:  1. Left ventricular chamber size, wall thickness, and systolic function are  normal. The visually estimated left ventricular ejection fraction is 55-60%.  2. Right ventricular  chamber size and systolic function are normal.  3. No hemodynamically significant valvular abnormalities.  4. Non-aneurysmal enlargement of the aortic root measurinig 4.2 cm.    OUTSIDE RECORDS  Outside referral notes and chart notes were reviewed and pertinent information has been summarized (in addition to the HPI):-        IMPRESSION/REPORT/PLAN  Neuroforaminal stenosis of cervical spine rule out cervical radiculopathy  Other migraine without status migrainosus, not intractable  Exertional dizziness/lightheadedness  Left arm weakness/numbness    This is a 64 year old male with multiple symptoms  1.  The left arm weakness/along with shooting pain down the arm is suggestive of cervical radiculopathy.  MRI C-spine does show multilevel left-sided neuroforaminal stenosis.  Will check a EMG to confirm diagnosis and to rule out entrapment neuropathy.  Will set him up with physical therapy in the meantime to see if it helps.    2.  This is headaches radiating from the neck to his left temple are suggestive of migrainous headaches.  Could be related to cervical degeneration.  Will see if physical therapy helps.  Nortriptyline has made a difference and will increase the dose further to see if it provides more benefit.  Continue using over-the-counter medications if needed for abortive therapy.    3.  His exertional dizziness/lightheadedness is more suggestive of cardiac problems.  Cardiac testing so far has been noncontributory.  He has been following up with cardiology.  Will check a CT angiogram head and neck to rule out any vertebrobasilar insufficiency which could be causing the lightheadedness.    I can see him back in 3 to 4 months after testing.  He does travel in the winter and can do physical therapy during vacation.    -     EMG; Future  -     Physical Therapy  Referral; Future  -     CTA Head Neck w Contrast; Future  -     nortriptyline (PAMELOR) 10 MG capsule; Take 2 cap/night and then increase by 1  cap/week to a max of 3 caps/night as needed and tolerated.    Return in about 4 months (around 4/2/2025) for In-Clinic Visit (must), After testing.    Over 60 minutes were spent coordinating the care for the patient on the day of the encounter.  This includes previsit, during visit and post visit activities as documented above.  Counseling patient.  Multiple problems reviewed/addressed.  Reviewing outside records.  Testing ordered.  Prescription management.  (Activities include but not inclusive of reviewing chart, reviewing outside records, reviewing labs and imaging study results as well as the images, patient visit time including getting history and exam,  use if applicable, review of test results with the patient and coming up with a plan in a shared model, counseling patient and family, education and answering patient questions, EMR , EMR diagnosis entry and problem list management, medication reconciliation and prescription management if applicable, paperwork if applicable, printing documents and documentation of the visit activities.)          Steve Funk MD  Neurologist  [facility] Neurology [facility]  Tel:- [phone]    This note was dictated using voice recognition software.  Any grammatical or context distortions are unintentional and inherent to the software.

## 2025-02-05 ENCOUNTER — TRANSFERRED RECORDS (OUTPATIENT)
Dept: HEALTH INFORMATION MANAGEMENT | Facility: CLINIC | Age: 65
End: 2025-02-05
Payer: COMMERCIAL

## 2025-03-19 ENCOUNTER — TRANSFERRED RECORDS (OUTPATIENT)
Dept: HEALTH INFORMATION MANAGEMENT | Facility: CLINIC | Age: 65
End: 2025-03-19
Payer: COMMERCIAL

## 2025-04-16 ENCOUNTER — OFFICE VISIT (OUTPATIENT)
Dept: NEUROLOGY | Facility: CLINIC | Age: 65
End: 2025-04-16
Attending: PSYCHIATRY & NEUROLOGY
Payer: COMMERCIAL

## 2025-04-16 VITALS
HEIGHT: 70 IN | SYSTOLIC BLOOD PRESSURE: 112 MMHG | BODY MASS INDEX: 28.63 KG/M2 | DIASTOLIC BLOOD PRESSURE: 74 MMHG | HEART RATE: 76 BPM | WEIGHT: 200 LBS

## 2025-04-16 DIAGNOSIS — G56.02 CARPAL TUNNEL SYNDROME OF LEFT WRIST: ICD-10-CM

## 2025-04-16 DIAGNOSIS — R29.898 ARM WEAKNESS: ICD-10-CM

## 2025-04-16 DIAGNOSIS — G43.809 OTHER MIGRAINE WITHOUT STATUS MIGRAINOSUS, NOT INTRACTABLE: ICD-10-CM

## 2025-04-16 DIAGNOSIS — M48.02 NEUROFORAMINAL STENOSIS OF CERVICAL SPINE: Primary | ICD-10-CM

## 2025-04-16 DIAGNOSIS — M48.02 NEUROFORAMINAL STENOSIS OF CERVICAL SPINE: ICD-10-CM

## 2025-04-16 DIAGNOSIS — R53.83 OTHER FATIGUE: ICD-10-CM

## 2025-04-16 PROCEDURE — 95886 MUSC TEST DONE W/N TEST COMP: CPT | Mod: LT | Performed by: PSYCHIATRY & NEUROLOGY

## 2025-04-16 PROCEDURE — 99215 OFFICE O/P EST HI 40 MIN: CPT | Performed by: PSYCHIATRY & NEUROLOGY

## 2025-04-16 PROCEDURE — 3078F DIAST BP <80 MM HG: CPT | Performed by: PSYCHIATRY & NEUROLOGY

## 2025-04-16 PROCEDURE — 95909 NRV CNDJ TST 5-6 STUDIES: CPT | Performed by: PSYCHIATRY & NEUROLOGY

## 2025-04-16 PROCEDURE — G2211 COMPLEX E/M VISIT ADD ON: HCPCS | Performed by: PSYCHIATRY & NEUROLOGY

## 2025-04-16 PROCEDURE — 3074F SYST BP LT 130 MM HG: CPT | Performed by: PSYCHIATRY & NEUROLOGY

## 2025-04-16 RX ORDER — NORTRIPTYLINE HYDROCHLORIDE 10 MG/1
CAPSULE ORAL
Qty: 180 CAPSULE | Refills: 1 | Status: SHIPPED | OUTPATIENT
Start: 2025-04-16

## 2025-04-16 NOTE — PROCEDURES
[facility]    Formerly [facility]  [address], [address]  [place] [address]  Tel: [phone]  Fax: [phone]          Full Name: Jimbo Sigala Gender: Male  Patient ID: [id number] YOB: 1960      Visit Date: 4/16/2025 08:25  Age: 64 Years 7 Months Old  Interpreted By: Steve Funk MD   Ref Dr.: Adam Ty MD  Tech:    Height: 5 feet 10 inch  Reason for referral: Evaluate left upper. c/o numbness, tightness, weakness in left arm/hand/fingers > 3 months.        Motor NCS      Nerve / Sites Lat Amp Dist Karl    ms mV cm m/s   L Median - APB      Wrist 5.10 6.2 7       Elbow 10.42 5.2 27 51   L Ulnar - ADM      Wrist 2.92 11.8 7       B.Elbow 6.46 10.8 20.5 58      A.Elbow 9.01 10.3 14 55       F  Wave      Nerve Fmin    ms   L Ulnar - ADM 31.56       Sensory NCS      Nerve / Sites Onset Lat Pk Lat Amp.2-3 Dist Karl    ms ms  V cm m/s   L Median - II (Antidr)      Wrist 2.81 3.70 13.7 13 46   L Ulnar - V (Antidr)      Wrist 2.03 2.76 20.5 11 54   L Median, Ulnar - Transcarpal comparison      Median Palm 2.24 2.92 37.4 8 36      Ulnar Palm 1.56 2.14 32.4 8 51       EMG Summary Table     Spontaneous MUAP Rcmt Note   Muscle Fib PSW Fasc IA # Amp Dur PPP Rate Type   L. Brachioradialis None None None N N N N N N N   L. Pronator teres None None None N N N N N N N   L. Biceps brachii None None None N N N N N N N   L. Deltoid None None None N N N N N N N   L. Triceps brachii None None None N N N N N N N   L. Extensor digitorum communis None None None N N N N N N N   L. Flexor carpi ulnaris None None None N N N N N N N   L. First dorsal interosseous None None None N N N N N N N   L. Abductor pollicis brevis Occ Occ None N Sl Mod Red Incr Incr N N N         SUMMARY  Nerve conduction and EMG study of left upper extremity shows:    Prolonged left median nerve distal motor latency with low normal amplitude and conduction velocity.  Normal  left ulnar distal motor latency, amplitude and conduction velocity.  Abnormal left median and normal left ulnar sensory SNAPs.  Prolonged left median-ulnar transcarpal peak latency comparison.  Monopolar needle exam as above.      CLINICAL INTERPRETATION:  This is an abnormal nerve conduction and EMG study.  The study is suggestive of moderate left median neuropathy (suspected carpal tunnel syndrome).  Further clinical correlation is needed.     Steve Funk MD  Neurologist  [facility] Neurology [facility]  Tel:- [phone]

## 2025-04-16 NOTE — PROGRESS NOTES
NEUROLOGY OUTPATIENT PROGRESS NOTE   Apr 16, 2025     CHIEF COMPLAINT/REASON FOR VISIT/REASON FOR CONSULT  Patient presents with:  Extremity Weakness: Patient looking for EMG results/ follow up    REASON FOR CONSULTATION-dizziness/left arm weakness/neck pain/headaches    REFERRAL SOURCE  Dr. Adam Ty  CC Dr. Adam Ty    HISTORY OF PRESENT ILLNESS  Jimbo Sigala is a 64 year old male seen today for evaluation of multiple issues.  Symptoms have been coming on over the last 3 to 4 years.  He did have COVID in 2020 at the beginning of the pandemic and feels that he continues to have ongoing symptoms from the COVID    1.  He reports ongoing neck pain with some shooting pain radiating down his arm but more pronounced from the forearm all the way down.  Does complain of numbness on both sides of the hand.  There is some weakness going on in the arm as well.  Has been dropping objects with his hand.  Has not done physical therapy.  Did have an MRI C-spine which did show multiple level neuroforaminal stenosis though epidural injections have not been done.    2.  He further complains of headaches that started at the base of the neck and then radiate all the way to the left temple.  These are more dull in nature.  Previously they were more migrainous in nature.  No major photophobia and photophobia.  No visual auras.  Has been put on gabapentin and nortriptyline which have been helping.  Nortriptyline is helping more than the Gabapentin.    3.  He further reports that with activity he will get more of a lightheadedness.  He has been working with cardiology and they have not really found a cause they had difficulty completing the stress test.  Neurological testing has been recommended by the cardiologist.  There is no vertiginous sensation.    4/16/25  Patient returns today  1.  He has been working with physical therapy in [place] and the neck pain and the weakness in the arm has been rare.  Overall this is  improved.  2.  Headaches have been nonexistent with the nortriptyline.  Takes 20 mg at nighttime.  3.  No further spells of dizziness  4.  Does complain of some daytime fatigue.  We discussed about sleep apnea versus insomnia causing his symptoms.  Does feel sleepy in the morning.  He further occasionally will get some jerking here and there which could be related to his neck though could be also related to feeling sleepy in the morning.  No other new concerns.    Previous history is reviewed and this is unchanged.    PAST MEDICAL/SURGICAL HISTORY  No past medical history on file.  There is no problem list on file for this patient.  Significant of high cholesterol, migraines, tremors, arthritis    FAMILY HISTORY  No family history on file.  Negative for neurological problems other than the memory problems    SOCIAL HISTORY  Social History     Tobacco Use    Smoking status: Never     Passive exposure: Past    Smokeless tobacco: Never   Substance Use Topics    Drug use: Never       SYSTEMS REVIEW  Twelve-system ROS was done and other than the HPI this was negative/positive for neck pain, back pain, arm and leg pain, joint pain, numbness/tingling, weakness, dizziness, ringing in the ears, vision symptoms, sleeping during the day, anxiety.    MEDICATIONS  Current Outpatient Medications   Medication Sig Dispense Refill    ALPRAZolam (XANAX) 0.25 MG tablet Take 0.25 mg by mouth as needed (A HALF HOUR BEFORE FLYING).      gabapentin (NEURONTIN) 100 MG capsule Take 200 mg by mouth 2 times daily.      Multiple Vitamin (ONE-A-DAY ESSENTIAL) TABS Take 1 tablet by mouth daily.      nortriptyline (PAMELOR) 10 MG capsule Take 2 cap/night and then increase by 1 cap/week to a max of 3 caps/night as needed and tolerated. 180 capsule 1    rosuvastatin (CRESTOR) 10 MG tablet Take 1 tablet (10 mg) by mouth daily. 90 tablet 1    diazepam (VALIUM) 5 MG tablet Take 5 mg by mouth as needed (30 MINUTES PRIOR TO MRI. MAY REPEAT IF NEEDED).  "(Patient not taking: Reported on 4/16/2025)       No current facility-administered medications for this visit.        PHYSICAL EXAMINATION  VITALS: /74   Pulse 76   Ht 1.778 m (5' 10\")   Wt 90.7 kg (200 lb)   BMI 28.70 kg/m    GENERAL: Healthy appearing, alert, no acute distress, normal habitus.  CARDIOVASCULAR: Extremities warm and well perfused. Pulses present.   NEUROLOGICAL:  Patient is awake and oriented to self, place and time.  Attention span is normal.  Memory is grossly intact.  Language is fluent and follows commands appropriately.  Appropriate fund of knowledge. Cranial nerves 2-12 are intact. There is no pronator drift.  Motor exam shows 5/5 strength in all extremities except left shoulder abduction and elbow flexion is 4/5.  Thumb opposition is slightly weak.  Tone is symmetric bilaterally in upper and lower extremities.  Reflexes are symmetric and 2+ in upper extremities and lower extremities. Sensory exam is grossly intact to light touch, pin prick and vibration except decreased pinprick in the left hand.  Finger to nose and heel to shin is without dysmetria.  Romberg is negative.  Gait is normal and the patient is able to do tandem walk and walk on toes and heels.  Exam stable.    DIAGNOSTICS  MRI IAC  1.  Normal enhanced MR I of the brain and internal auditory canals  2.  Mucosal thickening in the right maxillary sinus    MRI C-spine  1.  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  High-grade right foraminal stenosis at C4-C5 C5-C6 C6-C7 and C7-T1  3.  High-grade left foraminal stenosis at C6-C7 and C7-T1.  Moderate left form of stenosis at C3-C4 C4-C5 and C5-C6  4.  Prominent Modic type II changes at C5-C6    CARDIOLOGY      ECHO  Stress findings:  1. Non-diagnostic exercise stress echocardiogram due to the inability to  achieve the target heart rate. No evidence of stress-induced ischemia at the  achieved workload. Visually estimated post exercise left ventricular " ejection  fraction increases to greater than 70%.  2. No electrocardiographic evidence of stress-induced ischemia at the achieved  workload.  3. Functional capacity is mildly impaired. The patient exercised for 5:15  minutes on the Jamil protocol, achieving 7.1 METs and 79% the age-predicted  maximum heart rate. The patient experienced dizziness during exercise but did  not report chest pain.     Rest findings:  1. Left ventricular chamber size, wall thickness, and systolic function are  normal. The visually estimated left ventricular ejection fraction is 55-60%.  2. Right ventricular chamber size and systolic function are normal.  3. No hemodynamically significant valvular abnormalities.  4. Non-aneurysmal enlargement of the aortic root measurinig 4.2 cm.    OUTSIDE RECORDS  Outside referral notes and chart notes were reviewed and pertinent information has been summarized (in addition to the HPI):-      CTA  HEAD CTA:   1.  No high-grade proximal arterial stenosis/occlusion of the major intracranial arteries.  2.  No intracranial aneurysm or high flow vascular malformation identified.     NECK CTA:  1.  No hemodynamically significant stenosis in the neck vessels.   2.  No evidence for dissection.    EMG  CLINICAL INTERPRETATION:  This is an abnormal nerve conduction and EMG study.  The study is suggestive of moderate left median neuropathy (suspected carpal tunnel syndrome).  Further clinical correlation is needed.       IMPRESSION/REPORT/PLAN  Neuroforaminal stenosis of cervical spine rule out cervical radiculopathy  Other migraine without status migrainosus, not intractable  Exertional dizziness/lightheadedness  Left arm weakness/numbness  Left carpal tunnel syndrome  Fatigue    This is a 64 year old male with multiple symptoms  1.  The left arm weakness/along with shooting pain down the arm is suggestive of cervical radiculopathy.  This is episodic.  MRI C-spine does show multilevel left-sided neuroforaminal  stenosis.  She was negative for active radiculopathy.  He is done physical therapy with significant benefit.  Will set him up with physical therapy in [place] as previously he did attend [place].     2.  This is headaches radiating from the neck to his left temple are suggestive of migrainous headaches.  Could be related to cervical degeneration.  Physical therapy and nortriptyline has helped and will have him continue.  These are rare now.  Continue using over-the-counter medications if needed for abortive therapy.    3.  His exertional dizziness/lightheadedness is more suggestive of cardiac problems.  Cardiac testing so far has been noncontributory.  He has been following up with cardiology.  CT angiogram head and neck was negative for vertebral basilar insufficiency.  This has improved.  Will monitor.    4.  Does have some daytime fatigue could be related to sleep apnea.  Will set him up with a sleep provider    5.  EMG showed unrelated to above symptoms left carpal tunnel syndrome.  Recommend carpal tunnel braces.  Does not have any symptoms    Return in December before traveling to [place] in January      -     Physical Therapy  Referral; Future  -     Adult Sleep Evaluation & Management  Referral; Future  -     nortriptyline (PAMELOR) 10 MG capsule; Take 2 cap/night and then increase by 1 cap/week to a max of 3 caps/night as needed and tolerated.  -Trial of left carpal tunnel brace    Return in about 8 months (around 12/16/2025) for In-Clinic Visit (must).    Over 40 minutes were spent coordinating the care for the patient on the day of the encounter.  This includes previsit, during visit and post visit activities as documented above.  Counseling patient.  Multiple test reviewed.  Multiple problems reviewed.  Prescription management.  New problem.  Multiple problems addressed  (Activities include but not inclusive of reviewing chart, reviewing outside records, reviewing labs and imaging  study results as well as the images, patient visit time including getting history and exam,  use if applicable, review of test results with the patient and coming up with a plan in a shared model, counseling patient and family, education and answering patient questions, EMR , EMR diagnosis entry and problem list management, medication reconciliation and prescription management if applicable, paperwork if applicable, printing documents and documentation of the visit activities.)          Steve Funk MD  Neurologist  [facility]  Tel:- [phone]    This note was dictated using voice recognition software.  Any grammatical or context distortions are unintentional and inherent to the software.    The longitudinal plan of care for the diagnosis(es)/condition(s) as documented were addressed during this visit. Due to the added complexity in care, I will continue to support Jimbo in the subsequent management and with ongoing continuity of care.

## 2025-04-16 NOTE — LETTER
4/16/2025      Jimbo Sigala  [address] [place] [address]      Dear Colleague,    Thank you for referring your patient, Jimbo Sigala, to the [facility] [place]. Please see a copy of my visit note below.    See procedure note.       Again, thank you for allowing me to participate in the care of your patient.        Sincerely,        Steve Funk MD    Electronically signed

## 2025-04-16 NOTE — LETTER
4/16/2025      Jimbo Sigala  [address]   [place] [address]      Dear Colleague,    Thank you for referring your patient, Jimbo Sigala, to the [facility] [place]. Please see a copy of my visit note below.    NEUROLOGY OUTPATIENT PROGRESS NOTE   Apr 16, 2025     CHIEF COMPLAINT/REASON FOR VISIT/REASON FOR CONSULT  Patient presents with:  Extremity Weakness: Patient looking for EMG results/ follow up    REASON FOR CONSULTATION-dizziness/left arm weakness/neck pain/headaches    REFERRAL SOURCE  Dr. Adam Ty  CC Dr. Adam Ty    HISTORY OF PRESENT ILLNESS  Jimbo Sigala is a 64 year old male seen today for evaluation of multiple issues.  Symptoms have been coming on over the last 3 to 4 years.  He did have COVID in 2020 at the beginning of the pandemic and feels that he continues to have ongoing symptoms from the COVID    1.  He reports ongoing neck pain with some shooting pain radiating down his arm but more pronounced from the forearm all the way down.  Does complain of numbness on both sides of the hand.  There is some weakness going on in the arm as well.  Has been dropping objects with his hand.  Has not done physical therapy.  Did have an MRI C-spine which did show multiple level neuroforaminal stenosis though epidural injections have not been done.    2.  He further complains of headaches that started at the base of the neck and then radiate all the way to the left temple.  These are more dull in nature.  Previously they were more migrainous in nature.  No major photophobia and photophobia.  No visual auras.  Has been put on gabapentin and nortriptyline which have been helping.  Nortriptyline is helping more than the Gabapentin.    3.  He further reports that with activity he will get more of a lightheadedness.  He has been working with cardiology and they have not really found a cause they had difficulty completing the stress test.  Neurological  testing has been recommended by the cardiologist.  There is no vertiginous sensation.    4/16/25  Patient returns today  1.  He has been working with physical therapy in [place] and the neck pain and the weakness in the arm has been rare.  Overall this is improved.  2.  Headaches have been nonexistent with the nortriptyline.  Takes 20 mg at nighttime.  3.  No further spells of dizziness  4.  Does complain of some daytime fatigue.  We discussed about sleep apnea versus insomnia causing his symptoms.  Does feel sleepy in the morning.  He further occasionally will get some jerking here and there which could be related to his neck though could be also related to feeling sleepy in the morning.  No other new concerns.    Previous history is reviewed and this is unchanged.    PAST MEDICAL/SURGICAL HISTORY  No past medical history on file.  There is no problem list on file for this patient.  Significant of high cholesterol, migraines, tremors, arthritis    FAMILY HISTORY  No family history on file.  Negative for neurological problems other than the memory problems    SOCIAL HISTORY  Social History     Tobacco Use     Smoking status: Never     Passive exposure: Past     Smokeless tobacco: Never   Substance Use Topics     Drug use: Never       SYSTEMS REVIEW  Twelve-system ROS was done and other than the HPI this was negative/positive for neck pain, back pain, arm and leg pain, joint pain, numbness/tingling, weakness, dizziness, ringing in the ears, vision symptoms, sleeping during the day, anxiety.    MEDICATIONS  Current Outpatient Medications   Medication Sig Dispense Refill     ALPRAZolam (XANAX) 0.25 MG tablet Take 0.25 mg by mouth as needed (A HALF HOUR BEFORE FLYING).       gabapentin (NEURONTIN) 100 MG capsule Take 200 mg by mouth 2 times daily.       Multiple Vitamin (ONE-A-DAY ESSENTIAL) TABS Take 1 tablet by mouth daily.       nortriptyline (PAMELOR) 10 MG capsule Take 2 cap/night and then increase by 1 cap/week to  "a max of 3 caps/night as needed and tolerated. 180 capsule 1     rosuvastatin (CRESTOR) 10 MG tablet Take 1 tablet (10 mg) by mouth daily. 90 tablet 1     diazepam (VALIUM) 5 MG tablet Take 5 mg by mouth as needed (30 MINUTES PRIOR TO MRI. MAY REPEAT IF NEEDED). (Patient not taking: Reported on 4/16/2025)       No current facility-administered medications for this visit.        PHYSICAL EXAMINATION  VITALS: /74   Pulse 76   Ht 1.778 m (5' 10\")   Wt 90.7 kg (200 lb)   BMI 28.70 kg/m    GENERAL: Healthy appearing, alert, no acute distress, normal habitus.  CARDIOVASCULAR: Extremities warm and well perfused. Pulses present.   NEUROLOGICAL:  Patient is awake and oriented to self, place and time.  Attention span is normal.  Memory is grossly intact.  Language is fluent and follows commands appropriately.  Appropriate fund of knowledge. Cranial nerves 2-12 are intact. There is no pronator drift.  Motor exam shows 5/5 strength in all extremities except left shoulder abduction and elbow flexion is 4/5.  Thumb opposition is slightly weak.  Tone is symmetric bilaterally in upper and lower extremities.  Reflexes are symmetric and 2+ in upper extremities and lower extremities. Sensory exam is grossly intact to light touch, pin prick and vibration except decreased pinprick in the left hand.  Finger to nose and heel to shin is without dysmetria.  Romberg is negative.  Gait is normal and the patient is able to do tandem walk and walk on toes and heels.  Exam stable.    DIAGNOSTICS  MRI IAC  1.  Normal enhanced MR I of the brain and internal auditory canals  2.  Mucosal thickening in the right maxillary sinus    MRI C-spine  1.  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  High-grade right foraminal stenosis at C4-C5 C5-C6 C6-C7 and C7-T1  3.  High-grade left foraminal stenosis at C6-C7 and C7-T1.  Moderate left form of stenosis at C3-C4 C4-C5 and C5-C6  4.  Prominent Modic type II changes at " C5-C6    CARDIOLOGY      ECHO  Stress findings:  1. Non-diagnostic exercise stress echocardiogram due to the inability to  achieve the target heart rate. No evidence of stress-induced ischemia at the  achieved workload. Visually estimated post exercise left ventricular ejection  fraction increases to greater than 70%.  2. No electrocardiographic evidence of stress-induced ischemia at the achieved  workload.  3. Functional capacity is mildly impaired. The patient exercised for 5:15  minutes on the Jamil protocol, achieving 7.1 METs and 79% the age-predicted  maximum heart rate. The patient experienced dizziness during exercise but did  not report chest pain.     Rest findings:  1. Left ventricular chamber size, wall thickness, and systolic function are  normal. The visually estimated left ventricular ejection fraction is 55-60%.  2. Right ventricular chamber size and systolic function are normal.  3. No hemodynamically significant valvular abnormalities.  4. Non-aneurysmal enlargement of the aortic root measurinig 4.2 cm.    OUTSIDE RECORDS  Outside referral notes and chart notes were reviewed and pertinent information has been summarized (in addition to the HPI):-      CTA  HEAD CTA:   1.  No high-grade proximal arterial stenosis/occlusion of the major intracranial arteries.  2.  No intracranial aneurysm or high flow vascular malformation identified.     NECK CTA:  1.  No hemodynamically significant stenosis in the neck vessels.   2.  No evidence for dissection.    EMG  CLINICAL INTERPRETATION:  This is an abnormal nerve conduction and EMG study.  The study is suggestive of moderate left median neuropathy (suspected carpal tunnel syndrome).  Further clinical correlation is needed.       IMPRESSION/REPORT/PLAN  Neuroforaminal stenosis of cervical spine rule out cervical radiculopathy  Other migraine without status migrainosus, not intractable  Exertional dizziness/lightheadedness  Left arm weakness/numbness  Left  carpal tunnel syndrome  Fatigue    This is a 64 year old male with multiple symptoms  1.  The left arm weakness/along with shooting pain down the arm is suggestive of cervical radiculopathy.  This is episodic.  MRI C-spine does show multilevel left-sided neuroforaminal stenosis.  She was negative for active radiculopathy.  He is done physical therapy with significant benefit.  Will set him up with physical therapy in [place] as previously he did attend [place].     2.  This is headaches radiating from the neck to his left temple are suggestive of migrainous headaches.  Could be related to cervical degeneration.  Physical therapy and nortriptyline has helped and will have him continue.  These are rare now.  Continue using over-the-counter medications if needed for abortive therapy.    3.  His exertional dizziness/lightheadedness is more suggestive of cardiac problems.  Cardiac testing so far has been noncontributory.  He has been following up with cardiology.  CT angiogram head and neck was negative for vertebral basilar insufficiency.  This has improved.  Will monitor.    4.  Does have some daytime fatigue could be related to sleep apnea.  Will set him up with a sleep provider    5.  EMG showed unrelated to above symptoms left carpal tunnel syndrome.  Recommend carpal tunnel braces.  Does not have any symptoms    Return in December before traveling to [place] in January      -     Physical Therapy  Referral; Future  -     Adult Sleep Evaluation & Management  Referral; Future  -     nortriptyline (PAMELOR) 10 MG capsule; Take 2 cap/night and then increase by 1 cap/week to a max of 3 caps/night as needed and tolerated.    Return in about 8 months (around 12/16/2025) for In-Clinic Visit (must).    Over 30 minutes were spent coordinating the care for the patient on the day of the encounter.  This includes previsit, during visit and post visit activities as documented above.  Counseling patient.   Multiple test reviewed.  Multiple problems reviewed.  Prescription management  (Activities include but not inclusive of reviewing chart, reviewing outside records, reviewing labs and imaging study results as well as the images, patient visit time including getting history and exam,  use if applicable, review of test results with the patient and coming up with a plan in a shared model, counseling patient and family, education and answering patient questions, EMR , EMR diagnosis entry and problem list management, medication reconciliation and prescription management if applicable, paperwork if applicable, printing documents and documentation of the visit activities.)          Steve Funk MD  Neurologist  [facility]rology [facility]  Tel:- [phone]    This note was dictated using voice recognition software.  Any grammatical or context distortions are unintentional and inherent to the software.    The longitudinal plan of care for the diagnosis(es)/condition(s) as documented were addressed during this visit. Due to the added complexity in care, I will continue to support Jimbo in the subsequent management and with ongoing continuity of care.      Again, thank you for allowing me to participate in the care of your patient.        Sincerely,        Steve Funk MD    Electronically signed

## 2025-04-26 ENCOUNTER — HEALTH MAINTENANCE LETTER (OUTPATIENT)
Age: 65
End: 2025-04-26